# Patient Record
Sex: MALE | Race: OTHER | NOT HISPANIC OR LATINO | URBAN - METROPOLITAN AREA
[De-identification: names, ages, dates, MRNs, and addresses within clinical notes are randomized per-mention and may not be internally consistent; named-entity substitution may affect disease eponyms.]

---

## 2022-07-17 ENCOUNTER — EMERGENCY (EMERGENCY)
Facility: HOSPITAL | Age: 37
LOS: 1 days | Discharge: HOME | End: 2022-07-17
Attending: EMERGENCY MEDICINE | Admitting: EMERGENCY MEDICINE
Payer: COMMERCIAL

## 2022-07-17 VITALS
HEART RATE: 74 BPM | RESPIRATION RATE: 18 BRPM | DIASTOLIC BLOOD PRESSURE: 67 MMHG | SYSTOLIC BLOOD PRESSURE: 114 MMHG | OXYGEN SATURATION: 99 % | TEMPERATURE: 98 F

## 2022-07-17 VITALS
HEIGHT: 70 IN | OXYGEN SATURATION: 96 % | TEMPERATURE: 98 F | DIASTOLIC BLOOD PRESSURE: 76 MMHG | HEART RATE: 78 BPM | WEIGHT: 177.69 LBS | RESPIRATION RATE: 16 BRPM | SYSTOLIC BLOOD PRESSURE: 129 MMHG

## 2022-07-17 LAB
ALBUMIN SERPL ELPH-MCNC: 3.8 G/DL — SIGNIFICANT CHANGE UP (ref 3.3–5)
ALP SERPL-CCNC: 105 U/L — SIGNIFICANT CHANGE UP (ref 30–120)
ALT FLD-CCNC: 48 U/L DA — SIGNIFICANT CHANGE UP (ref 10–60)
ANION GAP SERPL CALC-SCNC: 10 MMOL/L — SIGNIFICANT CHANGE UP (ref 5–17)
AST SERPL-CCNC: 19 U/L — SIGNIFICANT CHANGE UP (ref 10–40)
BASOPHILS # BLD AUTO: 0.04 K/UL — SIGNIFICANT CHANGE UP (ref 0–0.2)
BASOPHILS NFR BLD AUTO: 0.6 % — SIGNIFICANT CHANGE UP (ref 0–2)
BILIRUB SERPL-MCNC: 0.3 MG/DL — SIGNIFICANT CHANGE UP (ref 0.2–1.2)
BUN SERPL-MCNC: 17 MG/DL — SIGNIFICANT CHANGE UP (ref 7–23)
CALCIUM SERPL-MCNC: 8.9 MG/DL — SIGNIFICANT CHANGE UP (ref 8.4–10.5)
CHLORIDE SERPL-SCNC: 106 MMOL/L — SIGNIFICANT CHANGE UP (ref 96–108)
CO2 SERPL-SCNC: 25 MMOL/L — SIGNIFICANT CHANGE UP (ref 22–31)
CREAT SERPL-MCNC: 1.15 MG/DL — SIGNIFICANT CHANGE UP (ref 0.5–1.3)
EGFR: 85 ML/MIN/1.73M2 — SIGNIFICANT CHANGE UP
EOSINOPHIL # BLD AUTO: 0.07 K/UL — SIGNIFICANT CHANGE UP (ref 0–0.5)
EOSINOPHIL NFR BLD AUTO: 1 % — SIGNIFICANT CHANGE UP (ref 0–6)
GLUCOSE SERPL-MCNC: 106 MG/DL — HIGH (ref 70–99)
HCT VFR BLD CALC: 41.4 % — SIGNIFICANT CHANGE UP (ref 39–50)
HGB BLD-MCNC: 14.1 G/DL — SIGNIFICANT CHANGE UP (ref 13–17)
IMM GRANULOCYTES NFR BLD AUTO: 0.1 % — SIGNIFICANT CHANGE UP (ref 0–1.5)
LYMPHOCYTES # BLD AUTO: 2.98 K/UL — SIGNIFICANT CHANGE UP (ref 1–3.3)
LYMPHOCYTES # BLD AUTO: 41.3 % — SIGNIFICANT CHANGE UP (ref 13–44)
MCHC RBC-ENTMCNC: 27 PG — SIGNIFICANT CHANGE UP (ref 27–34)
MCHC RBC-ENTMCNC: 34.1 GM/DL — SIGNIFICANT CHANGE UP (ref 32–36)
MCV RBC AUTO: 79.3 FL — LOW (ref 80–100)
MONOCYTES # BLD AUTO: 0.55 K/UL — SIGNIFICANT CHANGE UP (ref 0–0.9)
MONOCYTES NFR BLD AUTO: 7.6 % — SIGNIFICANT CHANGE UP (ref 2–14)
NEUTROPHILS # BLD AUTO: 3.56 K/UL — SIGNIFICANT CHANGE UP (ref 1.8–7.4)
NEUTROPHILS NFR BLD AUTO: 49.4 % — SIGNIFICANT CHANGE UP (ref 43–77)
NRBC # BLD: 0 /100 WBCS — SIGNIFICANT CHANGE UP (ref 0–0)
PLATELET # BLD AUTO: 282 K/UL — SIGNIFICANT CHANGE UP (ref 150–400)
POTASSIUM SERPL-MCNC: 3.8 MMOL/L — SIGNIFICANT CHANGE UP (ref 3.5–5.3)
POTASSIUM SERPL-SCNC: 3.8 MMOL/L — SIGNIFICANT CHANGE UP (ref 3.5–5.3)
PROT SERPL-MCNC: 7.5 G/DL — SIGNIFICANT CHANGE UP (ref 6–8.3)
RBC # BLD: 5.22 M/UL — SIGNIFICANT CHANGE UP (ref 4.2–5.8)
RBC # FLD: 13.8 % — SIGNIFICANT CHANGE UP (ref 10.3–14.5)
SODIUM SERPL-SCNC: 141 MMOL/L — SIGNIFICANT CHANGE UP (ref 135–145)
WBC # BLD: 7.21 K/UL — SIGNIFICANT CHANGE UP (ref 3.8–10.5)
WBC # FLD AUTO: 7.21 K/UL — SIGNIFICANT CHANGE UP (ref 3.8–10.5)

## 2022-07-17 PROCEDURE — 36415 COLL VENOUS BLD VENIPUNCTURE: CPT

## 2022-07-17 PROCEDURE — 70450 CT HEAD/BRAIN W/O DYE: CPT | Mod: MA

## 2022-07-17 PROCEDURE — 86666 EHRLICHIA ANTIBODY: CPT

## 2022-07-17 PROCEDURE — 86753 PROTOZOA ANTIBODY NOS: CPT

## 2022-07-17 PROCEDURE — 82962 GLUCOSE BLOOD TEST: CPT

## 2022-07-17 PROCEDURE — 70450 CT HEAD/BRAIN W/O DYE: CPT | Mod: 26,MA

## 2022-07-17 PROCEDURE — 99285 EMERGENCY DEPT VISIT HI MDM: CPT

## 2022-07-17 PROCEDURE — 85025 COMPLETE CBC W/AUTO DIFF WBC: CPT

## 2022-07-17 PROCEDURE — 99284 EMERGENCY DEPT VISIT MOD MDM: CPT | Mod: 25

## 2022-07-17 PROCEDURE — 86618 LYME DISEASE ANTIBODY: CPT

## 2022-07-17 PROCEDURE — 80053 COMPREHEN METABOLIC PANEL: CPT

## 2022-07-17 RX ORDER — VALACYCLOVIR 500 MG/1
1000 TABLET, FILM COATED ORAL ONCE
Refills: 0 | Status: COMPLETED | OUTPATIENT
Start: 2022-07-17 | End: 2022-07-17

## 2022-07-17 RX ORDER — VALACYCLOVIR 500 MG/1
1 TABLET, FILM COATED ORAL
Qty: 21 | Refills: 0
Start: 2022-07-17 | End: 2022-07-23

## 2022-07-17 RX ADMIN — Medication 60 MILLIGRAM(S): at 14:01

## 2022-07-17 RX ADMIN — Medication 1 DROP(S): at 14:02

## 2022-07-17 RX ADMIN — VALACYCLOVIR 1000 MILLIGRAM(S): 500 TABLET, FILM COATED ORAL at 14:01

## 2022-07-17 NOTE — ED PROVIDER NOTE - CARE PROVIDERS DIRECT ADDRESSES
,DirectAddress_Unknown ,DirectAddress_Unknown,[email or web address]

## 2022-07-17 NOTE — ED PROVIDER NOTE - PATIENT PORTAL LINK FT
You can access the FollowMyHealth Patient Portal offered by [facility] by registering at the following website: [email or web address]. By joining [employer]’s FollowMyHealth portal, you will also be able to view your health information using other applications (apps) compatible with our system.

## 2022-07-17 NOTE — ED PROVIDER NOTE - NSFOLLOWUPCLINICS_GEN_ALL_ED_FT
[facility]  Ophthalmology  [address] [place] [address] [address]  [place] [address]  Phone: [phone]  Fax:   Follow Up Time: 1-3 Days

## 2022-07-17 NOTE — ED ADULT NURSE NOTE - AS SC BRADEN SENSORY
All discharge paperwork reviewed with patient and MD, she denies any headache at this time.   Denies any further questions regarding her discharge instructions and need for wheelchair and ambulates self out of ED
Medicated for migraine of 9/10,patient resting on stretcher. Provided with gingerale, updated on plan of care,states that her  will be available to transporther home.
NAVNEET Avery at bedside to evaluate patient.
(4) no impairment

## 2022-07-17 NOTE — ED PROVIDER NOTE - NSFOLLOWUPINSTRUCTIONS_ED_ALL_ED_FT
drink plenty of fluids  prednisone once  a day for 1 week  valtrex three times a day for 1 week  artificial tears to left eye every couple hours while awake  follow up with neurology outpatient, referral provided   follow up with eye clinic if any eye pain develops, referral provided         Bacon Palsy    WHAT YOU NEED TO KNOW:    Bell palsy is a sudden weakness or paralysis of one side of your face. Bell palsy occurs when the nerve that controls the muscles in your face becomes swollen or irritated.     DISCHARGE INSTRUCTIONS:    Medicines:   •Medicine may be given to decrease swelling and irritation of your facial nerve. You may receive antiviral medicine if your healthcare provider thinks a virus caused your Bell palsy. Your healthcare provider may also suggest acetaminophen or ibuprofen to reduce pain. These medicines are available without a doctor's order. Ask which medicine to take, and how much you need. Follow directions. Acetaminophen can cause liver damage, and ibuprofen can cause stomach bleeding or kidney damage.       •Take your medicine as directed. Contact your healthcare provider if you think your medicine is not helping or if you have side effects. Tell him if you are allergic to any medicine. Keep a list of the medicines, vitamins, and herbs you take. Include the amounts, and when and why you take them. Bring the list or the pill bottles to follow-up visits. Carry your medicine list with you in case of an emergency.      Follow up with your healthcare provider as directed: Write down your questions so you remember to ask them during your visits.    Eye care: Your healthcare provider may recommend that you use artificial tears during the day to keep your eye moist. You may need to use an eye ointment at night. You may also need to wear a patch over your eye and tape it shut while you sleep. This helps keep your eye from getting dry and infected. Wear sunglasses to protect your eye from direct sunlight. Stay away from places that have fumes, dust, or other particles in the air that may harm your eye.    Physical therapy: A physical therapist may teach you how to massage your face and exercise the nerves and muscles in your face. This may help you get better sooner and prevent long-term problems. You can exercise on your own when your facial movement begins to return. Open and close your eye, wink, and smile wide. Do the exercises for 15 or 20 minutes several times a day.    Contact your healthcare provider if:   •You have a fever.      •Your eye becomes red, irritated, or painful.      •You have questions or concerns about your condition or care.      Return to the emergency department if:   •You develop weakness or numbness on one side of your body (other than your face).      •You have double vision, or you lose vision in your eye.      •You have trouble thinking clearly. drink plenty of fluids  prednisone once  a day for 1 week  valtrex three times a day for 1 week  artificial tears to left eye every couple hours while awake  follow up with neurology outpatient, referral provided   follow up with eye clinic if any eye pain develops, referral provided   tape eye shut at night with paper tape         Bacon Palsy    WHAT YOU NEED TO KNOW:    Bell palsy is a sudden weakness or paralysis of one side of your face. Bell palsy occurs when the nerve that controls the muscles in your face becomes swollen or irritated.     DISCHARGE INSTRUCTIONS:    Medicines:   •Medicine may be given to decrease swelling and irritation of your facial nerve. You may receive antiviral medicine if your healthcare provider thinks a virus caused your Bell palsy. Your healthcare provider may also suggest acetaminophen or ibuprofen to reduce pain. These medicines are available without a doctor's order. Ask which medicine to take, and how much you need. Follow directions. Acetaminophen can cause liver damage, and ibuprofen can cause stomach bleeding or kidney damage.       •Take your medicine as directed. Contact your healthcare provider if you think your medicine is not helping or if you have side effects. Tell him if you are allergic to any medicine. Keep a list of the medicines, vitamins, and herbs you take. Include the amounts, and when and why you take them. Bring the list or the pill bottles to follow-up visits. Carry your medicine list with you in case of an emergency.      Follow up with your healthcare provider as directed: Write down your questions so you remember to ask them during your visits.    Eye care: Your healthcare provider may recommend that you use artificial tears during the day to keep your eye moist. You may need to use an eye ointment at night. You may also need to wear a patch over your eye and tape it shut while you sleep. This helps keep your eye from getting dry and infected. Wear sunglasses to protect your eye from direct sunlight. Stay away from places that have fumes, dust, or other particles in the air that may harm your eye.    Physical therapy: A physical therapist may teach you how to massage your face and exercise the nerves and muscles in your face. This may help you get better sooner and prevent long-term problems. You can exercise on your own when your facial movement begins to return. Open and close your eye, wink, and smile wide. Do the exercises for 15 or 20 minutes several times a day.    Contact your healthcare provider if:   •You have a fever.      •Your eye becomes red, irritated, or painful.      •You have questions or concerns about your condition or care.      Return to the emergency department if:   •You develop weakness or numbness on one side of your body (other than your face).      •You have double vision, or you lose vision in your eye.      •You have trouble thinking clearly.

## 2022-07-17 NOTE — ED PROVIDER NOTE - NEURO NEGATIVE STATEMENT, MLM
no loss of consciousness, no gait abnormality, no headache, left sided facial weakness and numbness since yesterday. denies other weakness

## 2022-07-17 NOTE — ED PROVIDER NOTE - CLINICAL SUMMARY MEDICAL DECISION MAKING FREE TEXT BOX
Mirella Elaine for attending Dr. Kirk:  36 y/o male with no significant PMHx presents to the ED c/o left sided facial weakness since last night. Pt noticed yesterday his L eye was tearing all day. Today, pt noticed that he could not close L eye. Pt took benadryl last night and today PTA because he thought he was having an allergic reaction. Pt was seen at urgent care PTA and told to come to ED for further evaluation. Denies HA, blurry vision, n/v, cp sob, abd pain, numbness in extremities, or pain to L eye. No recent extended travel. +recently sick with sinus infection and took antibiotics, unsure of which one and finished course a week ago. Patient is presenting to the emergency room with a chief complaint of left-sided facial paralysis as symptoms seem to be affecting the forehead high suspicion for Bell's.  Will obtain screening labs CT head and send off tickborne panel as lyme has been known to cause Bell's palsy.  No acute intracranial pathology is noted we will treat symptomatically for Bell's.  Patient advised that he will need to tape his eye shut at night and apply over-the-counter eyedrops liberally. There is low suspicion for CVA and patient would not be a candidate for tPA as his symptoms began yesterday

## 2022-07-17 NOTE — ED PROVIDER NOTE - NS ED ATTENDING STATEMENT MOD
This was a shared visit with the ILIA. I reviewed and verified the documentation and independently performed the documented:

## 2022-07-17 NOTE — ED PROVIDER NOTE - PROVIDER TOKENS
PROVIDER:[TOKEN:[[id number]],FOLLOWUP:[1-3 Days]] PROVIDER:[TOKEN:[[id number]],FOLLOWUP:[1-3 Days]],PROVIDER:[TOKEN:[[id number]],FOLLOWUP:[1-3 Days]]

## 2022-07-17 NOTE — ED ADULT TRIAGE NOTE - CHIEF COMPLAINT QUOTE
" Urgent Care sent me here, I can't move left side of my face, feels numb, left eye not closing started yesterday 7PM, recent sinus infection on oral antibiotics "  Exam by KERRY Reyes on arrival

## 2022-07-17 NOTE — ED PROVIDER NOTE - NEUROLOGICAL, MLM
Alert and oriented, asymmetric eyebrow raise and smile (left sided weakness). obvious involvement of forehead and eye. unable to fully close left eye. no weakness to ext. normal finger to nose. good  strength bilaterally. ambulatory with steady gait.

## 2022-07-17 NOTE — ED PROVIDER NOTE - PROGRESS NOTE DETAILS
Reevaluated patient at bedside.  resting comfortably.  Discussed the results of all diagnostic testing in ED and copies of all reports given. suspect bell's palsy. will start on anti-virals and prednisone. follow up with neurology and eye clinic.   An opportunity to ask questions was given.  Discussed the importance of prompt, close medical follow-up.  Patient will return with any changes, concerns or persistent / worsening symptoms.  Understanding of all instructions verbalized.

## 2022-07-17 NOTE — ED PROVIDER NOTE - CARE PROVIDER_API CALL
Gladis Kinney  NEUROLOGY  [address] [facility], [facility] [address]  [place] [address]  Phone: [phone]  Fax: [phone]  Follow Up Time: 1-3 Days   Gladis Kinney  NEUROLOGY  [address] [facility], [address]  [place] [address]  Phone: [phone]  Fax: [phone]  Follow Up Time: 1-3 Days    Christine Sigala)  Internal Medicine  [address] [place] [address]  Phone: [phone]  Fax: [phone]  Follow Up Time: 1-3 Days

## 2022-07-17 NOTE — ED ADULT NURSE NOTE - OBJECTIVE STATEMENT
37-year-old male brought in by ambulance for left-sided facial weakness that started last night around 7 PM.  Patient noticed left side of his face was weak and was having a difficult time chewing his food yesterday.  Also noticed his left eye will not fully close and reports left eye feels dry.  Slight numbness to left side of face.  Denies any slurred speech or difficulty speaking.  Denies headache, dizziness, confusion, or memory loss.  Denies chest pain or shortness of breath.  Denies any difficulty in walking or weakness to other parts of his body.  Does report sinus infection a couple weeks ago and took antibiotics.  The symptoms have now resolved.  Denies any known tick bites or rashes.  Denies history of similar symptoms.

## 2022-07-17 NOTE — ED PROVIDER NOTE - ATTENDING APP SHARED VISIT CONTRIBUTION OF CARE
Patient is a 37-year-old male who presents to the emergency room with a chief complaint of left-sided facial paralysis patient with no significant past medical history reports several weeks ago he was experiencing sinus congestion.  He did follow-up with urgent care was diagnosed with sinusitis and was treated with an unknown antibiotic which she completed approximately 1 week ago.  Reports that yesterday morning he noticed a lot of clearing from his left eye he also noticed some diminished sensation to the left side of his face.  He reports especially in the evening he noticed that it was difficult for him to drink.  He initially thought this may be some type of allergic reaction he took a Benadryl once asleep and when he woke up this morning he noticed that he could not fully close the left eye with continued symptoms.  Did another Benadryl with no improvement of symptoms and then went to urgent care.  After evaluation in the urgent care decided the patient needed to come to the emergency room for further work-up.  EMS was called and patient was taken to the hospital for further work-up. On exam patient is laying in bed in no acute distress normocephalic atraumatic pupils are equal round and reactive to light extraocular muscles are intact heart is regular rate lungs are clear to auscultation abdomen is soft nontender nondistended.  Patient is unable to fully close his left eye and is unable to raise his left eyebrow symptoms are affecting the forehead and he is unable to wrinkle the forehead on the left side his smile appears to be fairly symmetrical though there is some objective diminished sensation to the left side of his face although he does feel gross touch tongue is midline.  Remainder of neurologic exam is nonfocal.  TMs are clear no skin changes noted.  Patient is ambulating without ataxia 5 out of 5 muscle strength in all extremities x4 no arm or leg drift noted.  Patient is presenting to the emergency room with a chief complaint of left-sided facial paralysis as symptoms seem to be affecting the forehead high suspicion for Bell's.  Will obtain screening labs CT head and send off tickborne panel as lyme has been known to cause Bell's palsy.  No acute intracranial pathology is noted we will treat symptomatically for Bell's.  Patient advised that he will need to tape his eye shut at night and apply over-the-counter eyedrops liberally. There is low suspicion for CVA and patient would not be a candidate for tPA as his symptoms began yesterday

## 2022-07-20 LAB
A PHAGOCYTOPH IGG TITR SER IF: SIGNIFICANT CHANGE UP TITER
B BURGDOR AB SER QL IA: NEGATIVE — SIGNIFICANT CHANGE UP
B MICROTI IGG TITR SER: SIGNIFICANT CHANGE UP TITER
E CHAFFEENSIS IGG TITR SER IF: SIGNIFICANT CHANGE UP TITER

## 2022-07-21 PROBLEM — Z78.9 OTHER SPECIFIED HEALTH STATUS: Chronic | Status: ACTIVE | Noted: 2022-07-17

## 2022-07-22 PROBLEM — Z00.00 ENCOUNTER FOR PREVENTIVE HEALTH EXAMINATION: Status: ACTIVE | Noted: 2022-07-22

## 2022-07-23 ENCOUNTER — APPOINTMENT (OUTPATIENT)
Dept: MRI IMAGING | Facility: CLINIC | Age: 37
End: 2022-07-23

## 2022-07-23 ENCOUNTER — RESULT REVIEW (OUTPATIENT)
Age: 37
End: 2022-07-23

## 2022-07-23 ENCOUNTER — OUTPATIENT (OUTPATIENT)
Dept: OUTPATIENT SERVICES | Facility: HOSPITAL | Age: 37
LOS: 1 days | End: 2022-07-23
Payer: COMMERCIAL

## 2022-07-23 ENCOUNTER — TRANSCRIPTION ENCOUNTER (OUTPATIENT)
Age: 37
End: 2022-07-23

## 2022-07-23 DIAGNOSIS — Z00.8 ENCOUNTER FOR OTHER GENERAL EXAMINATION: ICD-10-CM

## 2022-07-23 PROCEDURE — A9585: CPT

## 2022-07-23 PROCEDURE — 70553 MRI BRAIN STEM W/O & W/DYE: CPT | Mod: 26

## 2022-07-23 PROCEDURE — 70553 MRI BRAIN STEM W/O & W/DYE: CPT

## 2022-07-26 ENCOUNTER — NON-APPOINTMENT (OUTPATIENT)
Age: 37
End: 2022-07-26

## 2022-11-09 ENCOUNTER — NON-APPOINTMENT (OUTPATIENT)
Age: 37
End: 2022-11-09

## 2025-01-21 ENCOUNTER — NON-APPOINTMENT (OUTPATIENT)
Age: 40
End: 2025-01-21